# Patient Record
Sex: FEMALE | Race: WHITE | Employment: FULL TIME | URBAN - METROPOLITAN AREA
[De-identification: names, ages, dates, MRNs, and addresses within clinical notes are randomized per-mention and may not be internally consistent; named-entity substitution may affect disease eponyms.]

---

## 2021-05-11 ENCOUNTER — HOSPITAL ENCOUNTER (EMERGENCY)
Facility: CLINIC | Age: 36
Discharge: HOME | End: 2021-05-12
Attending: EMERGENCY MEDICINE | Admitting: EMERGENCY MEDICINE
Payer: COMMERCIAL

## 2021-05-11 VITALS
OXYGEN SATURATION: 98 % | SYSTOLIC BLOOD PRESSURE: 157 MMHG | RESPIRATION RATE: 16 BRPM | DIASTOLIC BLOOD PRESSURE: 107 MMHG | HEART RATE: 68 BPM

## 2021-05-11 DIAGNOSIS — T50.905A MEDICATION REACTION, INITIAL ENCOUNTER: ICD-10-CM

## 2021-05-11 DIAGNOSIS — R55 NEAR SYNCOPE: ICD-10-CM

## 2021-05-11 PROCEDURE — 99284 EMERGENCY DEPT VISIT MOD MDM: CPT

## 2021-05-11 PROCEDURE — C9803 HOPD COVID-19 SPEC COLLECT: HCPCS

## 2021-05-11 PROCEDURE — 81025 URINE PREGNANCY TEST: CPT | Performed by: EMERGENCY MEDICINE

## 2021-05-11 PROCEDURE — 85025 COMPLETE CBC W/AUTO DIFF WBC: CPT | Performed by: EMERGENCY MEDICINE

## 2021-05-11 PROCEDURE — 93005 ELECTROCARDIOGRAM TRACING: CPT

## 2021-05-11 PROCEDURE — 87635 SARS-COV-2 COVID-19 AMP PRB: CPT | Performed by: EMERGENCY MEDICINE

## 2021-05-11 PROCEDURE — 80048 BASIC METABOLIC PNL TOTAL CA: CPT | Performed by: EMERGENCY MEDICINE

## 2021-05-11 PROCEDURE — 81001 URINALYSIS AUTO W/SCOPE: CPT | Performed by: EMERGENCY MEDICINE

## 2021-05-11 ASSESSMENT — ENCOUNTER SYMPTOMS
DIARRHEA: 0
NAUSEA: 0
CONSTIPATION: 0
SHORTNESS OF BREATH: 0
VOMITING: 0
DIZZINESS: 1
HEADACHES: 0
COUGH: 0
NECK PAIN: 0
MYALGIAS: 1
CHILLS: 1
WEAKNESS: 1
DIFFICULTY URINATING: 0
DYSURIA: 0
FATIGUE: 1
FEVER: 0

## 2021-05-12 LAB
ALBUMIN UR-MCNC: NEGATIVE MG/DL
ANION GAP SERPL CALCULATED.3IONS-SCNC: 3 MMOL/L (ref 3–14)
APPEARANCE UR: CLEAR
BASOPHILS # BLD AUTO: 0 10E9/L (ref 0–0.2)
BASOPHILS NFR BLD AUTO: 0.7 %
BILIRUB UR QL STRIP: NEGATIVE
BUN SERPL-MCNC: 14 MG/DL (ref 7–30)
CALCIUM SERPL-MCNC: 8.9 MG/DL (ref 8.5–10.1)
CHLORIDE SERPL-SCNC: 108 MMOL/L (ref 94–109)
CO2 SERPL-SCNC: 28 MMOL/L (ref 20–32)
COLOR UR AUTO: ABNORMAL
CREAT SERPL-MCNC: 0.77 MG/DL (ref 0.52–1.04)
DIFFERENTIAL METHOD BLD: NORMAL
EOSINOPHIL # BLD AUTO: 0.1 10E9/L (ref 0–0.7)
EOSINOPHIL NFR BLD AUTO: 2 %
ERYTHROCYTE [DISTWIDTH] IN BLOOD BY AUTOMATED COUNT: 11.2 % (ref 10–15)
GFR SERPL CREATININE-BSD FRML MDRD: >90 ML/MIN/{1.73_M2}
GLUCOSE SERPL-MCNC: 109 MG/DL (ref 70–99)
GLUCOSE UR STRIP-MCNC: NEGATIVE MG/DL
HCG UR QL: NEGATIVE
HCT VFR BLD AUTO: 36.6 % (ref 35–47)
HGB BLD-MCNC: 12.9 G/DL (ref 11.7–15.7)
HGB UR QL STRIP: ABNORMAL
IMM GRANULOCYTES # BLD: 0 10E9/L (ref 0–0.4)
IMM GRANULOCYTES NFR BLD: 0.3 %
INTERPRETATION ECG - MUSE: NORMAL
KETONES UR STRIP-MCNC: NEGATIVE MG/DL
LABORATORY COMMENT REPORT: NORMAL
LEUKOCYTE ESTERASE UR QL STRIP: NEGATIVE
LYMPHOCYTES # BLD AUTO: 1.8 10E9/L (ref 0.8–5.3)
LYMPHOCYTES NFR BLD AUTO: 29.5 %
MCH RBC QN AUTO: 32.3 PG (ref 26.5–33)
MCHC RBC AUTO-ENTMCNC: 35.2 G/DL (ref 31.5–36.5)
MCV RBC AUTO: 92 FL (ref 78–100)
MONOCYTES # BLD AUTO: 0.5 10E9/L (ref 0–1.3)
MONOCYTES NFR BLD AUTO: 8.1 %
MUCOUS THREADS #/AREA URNS LPF: PRESENT /LPF
NEUTROPHILS # BLD AUTO: 3.7 10E9/L (ref 1.6–8.3)
NEUTROPHILS NFR BLD AUTO: 59.4 %
NITRATE UR QL: NEGATIVE
NRBC # BLD AUTO: 0 10*3/UL
NRBC BLD AUTO-RTO: 0 /100
PH UR STRIP: 5 PH (ref 5–7)
PLATELET # BLD AUTO: 317 10E9/L (ref 150–450)
POTASSIUM SERPL-SCNC: 3.9 MMOL/L (ref 3.4–5.3)
RBC # BLD AUTO: 4 10E12/L (ref 3.8–5.2)
RBC #/AREA URNS AUTO: <1 /HPF (ref 0–2)
SARS-COV-2 RNA RESP QL NAA+PROBE: NEGATIVE
SODIUM SERPL-SCNC: 139 MMOL/L (ref 133–144)
SOURCE: ABNORMAL
SP GR UR STRIP: 1.01 (ref 1–1.03)
SPECIMEN SOURCE: NORMAL
SQUAMOUS #/AREA URNS AUTO: <1 /HPF (ref 0–1)
UROBILINOGEN UR STRIP-MCNC: 0 MG/DL (ref 0–2)
WBC # BLD AUTO: 6.1 10E9/L (ref 4–11)
WBC #/AREA URNS AUTO: <1 /HPF (ref 0–5)

## 2021-05-12 NOTE — ED TRIAGE NOTES
Pt states that she took 2 new medications at 2130 progesterone and premarin and shortly after felt dizzy and that her arms were heavy.

## 2021-05-12 NOTE — ED PROVIDER NOTES
History   Chief Complaint:  Generalized Weakness     The history is provided by the patient.      Ellie Rivera is a 35 year old female with history of depression, asthma, IBS, asthma who presents with generalized weakness. The patient notes that she started new medications, progesterone and premarin, and took them for the first time tonight around 2130.  then about 30 minutes later she had felt very fatigued and weak all over and felt that her arms were very heavy and had troubles lifting them. She states that she also felt very fatigued as well. She states that she felt very weak as well. She also notes that she had chills and could not stop shivering. Her teeth were chattering.  She denies numbness, fever, chest pain, troubles breathing, recent illness or fevers. Denies exposures to COVID or recent cough, emesis, diarrhea, nausea, neck pain, or headache, dysuria, or urinary issues. She denies syncope.       Review of Systems   Constitutional: Positive for chills and fatigue. Negative for fever.   Respiratory: Negative for cough and shortness of breath.    Cardiovascular: Negative for chest pain.   Gastrointestinal: Negative for constipation, diarrhea, nausea and vomiting.   Genitourinary: Negative for difficulty urinating and dysuria.   Musculoskeletal: Positive for myalgias (bilateral arms). Negative for neck pain.   Neurological: Positive for dizziness and weakness. Negative for syncope and headaches.   All other systems reviewed and are negative.    Allergies:  No known drug allergies     Medications:  Progesteron  Premarin   Ativan   Sertraline     Past Medical History:    Depression   IBS  ADHD  Asthma      Past Surgical History:     section     Family History:    Alcoholism  Diabetes  Obesity  Bipolar disorder    Social History:  Smoking status: former smoker  Alcohol use: no  Drug use: no  PCP: Marco Estrada  Presents to the ED alone    Physical Exam     Patient Vitals for the past 24 hrs:   BP  Pulse Resp SpO2   05/11/21 2308 (!) 157/107 68 16 98 %       Physical Exam  Nursing note and vitals reviewed.  Constitutional:  Appears well-developed and well-nourished.   HENT:   Head:    Atraumatic.   Mouth/Throat:   Oropharynx is clear and moist. No oropharyngeal exudate.   Eyes:    Pupils are equal, round, and reactive to light.   Neck:    Normal range of motion. Neck supple.      No tracheal deviation present. No thyromegaly present.   Cardiovascular:  Normal rate, regular rhythm, no murmur   Pulmonary/Chest: Breath sounds are clear and equal without wheezes or crackles.  Abdominal:   Soft. Bowel sounds are normal. Exhibits no distension and      no mass. There is no tenderness.      There is no rebound and no guarding.   Musculoskeletal:  Exhibits no edema.   Lymphadenopathy:  No cervical adenopathy.   Neurological:   Alert and oriented to person, place, and time.   Skin:    Skin is warm and dry. No rash noted. No pallor.     Emergency Department Course     ECG:  ECG taken at 2353, ECG read at 0008  Normal sinus rhythm with short KS  Otherwise  ECG  Rate 67 bpm. KS interval 104 ms. QRS duration 96 ms. QT/QTc 418/441 ms. P-R-T axes 29 70 52.    Laboratory:  CBC: WBC 6.1, HGB 12.9,    BMP: Glucose 109(H), o/w WNL (Creatinine: 0.77)    UA: Blood: trace (A), Mucous: Present  HCG Qualitative: negative   Symptomatic Influenza A/B antigen & COVID-19 PCR: pending    Emergency Department Course:  Reviewed:  I reviewed the patient's nursing notes, vitals, past medical records, Care Everywhere.     Assessments:  2340 I performed an assessment and examination of the patient as noted above.      0038 The patient reports that she is feeling better and that she feels back to normal. Findings and plan explained to the Patient. Patient discharged home with instructions regarding supportive care, medications, and reasons to return. The importance of close follow-up was reviewed.     Disposition:  The patient was  discharged to home.       Impression & Plan   Medical Decision Making:  Ellie Rivera is a 35 year old female who presents for evaluation of adverse reaction to medication.  I feel her symptoms are likely either due to medications reaction or vasovagal near syncopal episode. Her symptom are completely resolved now and I did not feel that there was any cardiac or neurologic problem as a cause for her symptoms. She does note have any symptoms concerning for stroke. I considered also the possibility of early infection however, COVID 19 test returned negative, WBC was normal and her urinalysis was negative. She is not having any cough or respiratory symptoms, and does not have any abdominal pain or tenderness. I feel she was safe for discharge home since her symptoms have completely resolved. She was instructed to return if her symptoms returned otherwise to follow up with her OB to decide if she should stay on her medications.       Covid-19  Ellie Rivera was evaluated during a global COVID-19 pandemic, which necessitated consideration that the patient might be at risk for infection with the SARS-CoV-2 virus that causes COVID-19.   Applicable protocols for evaluation were followed during the patient's care.   COVID-19 was considered as part of the patient's evaluation. The plan for testing is:  a test was obtained during this visit.    Diagnosis:    ICD-10-CM    1. Near syncope  R55 Symptomatic SARS-CoV-2 COVID-19 Virus (Coronavirus) by PCR   2. Medication reaction, initial encounter  T50.905A        Scribe Disclosure:  Silvana FRANCOIS, am serving as a scribe at 11:39 PM on 5/11/2021 to document services personally performed by Shirley Watt MD based on my observations and the provider's statements to me.           Shirley Watt MD  05/12/21 0689

## 2021-06-09 ENCOUNTER — APPOINTMENT (OUTPATIENT)
Dept: MRI IMAGING | Facility: CLINIC | Age: 36
End: 2021-06-09
Attending: EMERGENCY MEDICINE
Payer: COMMERCIAL

## 2021-06-09 ENCOUNTER — HOSPITAL ENCOUNTER (EMERGENCY)
Facility: CLINIC | Age: 36
Discharge: HOME | End: 2021-06-09
Attending: EMERGENCY MEDICINE | Admitting: EMERGENCY MEDICINE
Payer: COMMERCIAL

## 2021-06-09 VITALS
OXYGEN SATURATION: 96 % | HEIGHT: 62 IN | TEMPERATURE: 99.1 F | HEART RATE: 89 BPM | DIASTOLIC BLOOD PRESSURE: 81 MMHG | BODY MASS INDEX: 25.76 KG/M2 | RESPIRATION RATE: 18 BRPM | WEIGHT: 140 LBS | SYSTOLIC BLOOD PRESSURE: 109 MMHG

## 2021-06-09 DIAGNOSIS — R42 DIZZINESS: ICD-10-CM

## 2021-06-09 DIAGNOSIS — R42 LIGHTHEADEDNESS: ICD-10-CM

## 2021-06-09 DIAGNOSIS — R26.9 ABNORMAL GAIT: ICD-10-CM

## 2021-06-09 LAB
ANION GAP SERPL CALCULATED.3IONS-SCNC: 6 MMOL/L (ref 3–14)
BASOPHILS # BLD AUTO: 0 10E9/L (ref 0–0.2)
BASOPHILS NFR BLD AUTO: 0.2 %
BUN SERPL-MCNC: 13 MG/DL (ref 7–30)
CALCIUM SERPL-MCNC: 9.5 MG/DL (ref 8.5–10.1)
CHLORIDE SERPL-SCNC: 105 MMOL/L (ref 94–109)
CO2 SERPL-SCNC: 23 MMOL/L (ref 20–32)
CREAT SERPL-MCNC: 0.77 MG/DL (ref 0.52–1.04)
D DIMER PPP FEU-MCNC: 0.4 UG/ML FEU (ref 0–0.5)
DIFFERENTIAL METHOD BLD: NORMAL
EOSINOPHIL # BLD AUTO: 0 10E9/L (ref 0–0.7)
EOSINOPHIL NFR BLD AUTO: 0.2 %
ERYTHROCYTE [DISTWIDTH] IN BLOOD BY AUTOMATED COUNT: 11.1 % (ref 10–15)
GFR SERPL CREATININE-BSD FRML MDRD: >90 ML/MIN/{1.73_M2}
GLUCOSE SERPL-MCNC: 96 MG/DL (ref 70–99)
HCG SERPL QL: NEGATIVE
HCT VFR BLD AUTO: 37 % (ref 35–47)
HGB BLD-MCNC: 13.1 G/DL (ref 11.7–15.7)
IMM GRANULOCYTES # BLD: 0 10E9/L (ref 0–0.4)
IMM GRANULOCYTES NFR BLD: 0.4 %
INTERPRETATION ECG - MUSE: NORMAL
LYMPHOCYTES # BLD AUTO: 0.8 10E9/L (ref 0.8–5.3)
LYMPHOCYTES NFR BLD AUTO: 9.8 %
MCH RBC QN AUTO: 32.4 PG (ref 26.5–33)
MCHC RBC AUTO-ENTMCNC: 35.4 G/DL (ref 31.5–36.5)
MCV RBC AUTO: 92 FL (ref 78–100)
MONOCYTES # BLD AUTO: 0.4 10E9/L (ref 0–1.3)
MONOCYTES NFR BLD AUTO: 4.9 %
NEUTROPHILS # BLD AUTO: 6.8 10E9/L (ref 1.6–8.3)
NEUTROPHILS NFR BLD AUTO: 84.5 %
NRBC # BLD AUTO: 0 10*3/UL
NRBC BLD AUTO-RTO: 0 /100
PLATELET # BLD AUTO: 314 10E9/L (ref 150–450)
POTASSIUM SERPL-SCNC: 4.3 MMOL/L (ref 3.4–5.3)
RBC # BLD AUTO: 4.04 10E12/L (ref 3.8–5.2)
SODIUM SERPL-SCNC: 134 MMOL/L (ref 133–144)
TROPONIN I SERPL-MCNC: <0.015 UG/L (ref 0–0.04)
TSH SERPL DL<=0.005 MIU/L-ACNC: 0.85 MU/L (ref 0.4–4)
WBC # BLD AUTO: 8.1 10E9/L (ref 4–11)

## 2021-06-09 PROCEDURE — 84703 CHORIONIC GONADOTROPIN ASSAY: CPT | Performed by: EMERGENCY MEDICINE

## 2021-06-09 PROCEDURE — 84484 ASSAY OF TROPONIN QUANT: CPT | Performed by: EMERGENCY MEDICINE

## 2021-06-09 PROCEDURE — 255N000002 HC RX 255 OP 636: Performed by: EMERGENCY MEDICINE

## 2021-06-09 PROCEDURE — 85379 FIBRIN DEGRADATION QUANT: CPT | Performed by: EMERGENCY MEDICINE

## 2021-06-09 PROCEDURE — 84443 ASSAY THYROID STIM HORMONE: CPT | Performed by: EMERGENCY MEDICINE

## 2021-06-09 PROCEDURE — 80048 BASIC METABOLIC PNL TOTAL CA: CPT | Performed by: EMERGENCY MEDICINE

## 2021-06-09 PROCEDURE — 70544 MR ANGIOGRAPHY HEAD W/O DYE: CPT

## 2021-06-09 PROCEDURE — A9585 GADOBUTROL INJECTION: HCPCS | Performed by: EMERGENCY MEDICINE

## 2021-06-09 PROCEDURE — 70553 MRI BRAIN STEM W/O & W/DYE: CPT

## 2021-06-09 PROCEDURE — 99285 EMERGENCY DEPT VISIT HI MDM: CPT | Mod: 25

## 2021-06-09 PROCEDURE — 85025 COMPLETE CBC W/AUTO DIFF WBC: CPT | Performed by: EMERGENCY MEDICINE

## 2021-06-09 PROCEDURE — 70549 MR ANGIOGRAPH NECK W/O&W/DYE: CPT

## 2021-06-09 PROCEDURE — 93005 ELECTROCARDIOGRAM TRACING: CPT

## 2021-06-09 RX ORDER — GADOBUTROL 604.72 MG/ML
10 INJECTION INTRAVENOUS ONCE
Status: COMPLETED | OUTPATIENT
Start: 2021-06-09 | End: 2021-06-09

## 2021-06-09 RX ADMIN — GADOBUTROL 10 ML: 604.72 INJECTION INTRAVENOUS at 13:19

## 2021-06-09 ASSESSMENT — ENCOUNTER SYMPTOMS
WEAKNESS: 1
NUMBNESS: 1
NAUSEA: 1
DIZZINESS: 0
LIGHT-HEADEDNESS: 0
DYSURIA: 0
FEVER: 0
HEMATURIA: 0
VOMITING: 0

## 2021-06-09 ASSESSMENT — MIFFLIN-ST. JEOR: SCORE: 1283.29

## 2021-06-09 NOTE — ED PROVIDER NOTES
History   Chief Complaint:  Dizziness     HPI   Ellie Rivera is a 35 year old female with history of ovarian failure on progesterone and estrogen who presents to the ED for an evaluation of dizziness, lightheadedness, and drifting off to the left with gait instability. The patient states her symptoms began progressively after she woke up this morning around 1030 to look after her daughter. She went to the bathroom and felt like she was going to pass out after but did not. She has not had lightheadedness and dizziness since. She also has developed some chest pain and nausea without emesis since the onset. She additionally endorses some subjective numbness/weakness. She otherwise denies any falls, fever, double vision, vision loss, dysuria, hematuria, vaginal cramping, or vaginal bleeding. She did have some bleeding a couple of days ago however but this was due to her period.       Review of Systems   Constitutional: Negative for fever.   Eyes: Negative for visual disturbance.   Cardiovascular: Positive for chest pain.   Gastrointestinal: Positive for nausea. Negative for vomiting.   Genitourinary: Negative for dysuria, hematuria, vaginal bleeding and vaginal pain.   Musculoskeletal: Positive for gait problem (leaning to the left).   Neurological: Positive for weakness (subjective) and numbness (subjective). Negative for dizziness (resolved) and light-headedness (resolved).   All other systems reviewed and are negative.    Allergies:  Methylphenidate analogues  Fluoxetine  Nitrofurantoin  Dextroamphetamine sulfate  SSRI's  Nitrofuran derivatives    Medications:  Senokot S  Percocet  Ativan  Procardia  Zoloft  Ativan  Prometrium  Premarin    Past Medical History:    Ovarian failure  Asthma  Depression  IBS  ADHD  Anxiety  Gonococcus infection  Anemia  GC cervicitis    Past Surgical History:     section    Family History:    Bipolar disorder  Type II diabetes mellitus  Anxiety    Social History:  The patient  "presented with /significant other and daughter.    Physical Exam     Patient Vitals for the past 24 hrs:   BP Temp Temp src Pulse Resp SpO2 Height Weight   06/09/21 1350 109/81 -- -- 89 -- -- -- --   06/09/21 1345 111/71 -- -- -- -- -- -- --   06/09/21 1330 (!) 135/97 -- -- 81 -- 96 % -- --   06/09/21 1200 (!) 177/136 -- -- 87 -- 100 % -- --   06/09/21 1137 (!) 162/107 99.1  F (37.3  C) Oral 90 18 100 % 1.575 m (5' 2\") 63.5 kg (140 lb)       Physical Exam  General: Alert, appears well-developed and well-nourished. Cooperative.     In mild distress  HEENT:  Head:  Atraumatic  Ears:  External ears are normal  Mouth/Throat:  Oropharynx is without erythema or exudate and mucous membranes are moist.   Eyes:   Conjunctivae normal and EOM are normal. No scleral icterus.    Pupils are equal, round, and reactive to light.   CV:  Normal rate, regular rhythm, normal heart sounds and radial pulses are 2+ and symmetric.  No murmur.  Resp:  Breath sounds are clear bilaterally    Non-labored, no retractions or accessory muscle use  GI:  Abdomen is soft, no distension, no tenderness. No rebound or guarding.  No CVA tenderness bilaterally  MS:  Normal range of motion. No edema.    Normal strength in all 4 extremities.     Back atraumatic.    No midline cervical, thoracic, or lumbar tenderness  Skin:  Warm and dry.  No rash or lesions noted.  Neuro: Alert. Normal strength.  Subjective numbness to left face and left upper extremity in comparison with right side. GCS: 15.  Cranial nerves 2-12 intact otherwise intact except for subjective numbness.  Finger-to-nose intact bilaterally.  Rapid alternating movements intact.  Gait not assessed due to lightheadedness.  Psych:  Very anxious. Normal mood and affect.    Emergency Department Course   ECG  ECG taken at 1153, ECG read at 1209  Normal sinus rhythm. Normal ECG.   No significant change as compared to prior, dated 5/11/21.  Rate 79 bpm. AK interval 138 ms. QRS duration 76 ms. " QT/QTc 386/442 ms. P-R-T axes 59 70 56.     Imaging:  MR Brain without & with contrast:  Normal brain MRI.     Imaging independently reviewed and agree with radiologist interpretation.     MR Head without contrast angiogram:  Normal MR angiogram of the head.       Imaging independently reviewed and agree with radiologist interpretation.     MRA Angiogram Neck without & with contrast:  Normal MR angiogram of the neck.     Imaging independently reviewed and agree with radiologist interpretation.     Laboratory:  CBC: WBC 8.1, HGB 13.1,      BMP: AWNL (Creatinine 0.77)     Ddimer: 0.4    TSH: 0.85    Troponin (Collected 1150): <0.015    HCG Qualitative Pregnancy Blood: negative    Emergency Department Course:    Reviewed:  1132 I reviewed nursing notes, vitals and past medical history.    Assessments:  1137 I obtained history and examined the patient as noted above.   1334 I rechecked the patient and discussed her work-up here thus far.   1400 I rechecked the patient and discussed all results.    Consults:   1150 I spoke with the stroke neurology team regarding the patient's presentation, findings, and plan of care.    Interventions:  1319: Gadavist 10 mL IV    Disposition:  The patient was discharged to home.       Impression & Plan   Medical Decision Making:  Ellie Rivera is a 35 year old female who presents to the emergency department approximately 1 hour after sudden onset dizziness, lightheadedness, and drifting off towards the left with gait instability. Patient initially was mildly hypertensive on arrival and out of concern for potential sinister intracranial pathology such as stroke or intracranial hemorrhage we did obtain emergent MRI imaging of the brain including MRI angiography of the head and neck. Reassuringly MRI imaging of the brain showed no evidence of sinister acute intracranial process. Angiography of the head/neck was unremarkable with no evidence of stenosis. EKG showed normal sinus  rhythm with no concerning ischemic changes. Her troponins undetectable and lower concern for ACS. Reassuringly no evidence of cardiac arrhythmias  Patient is not currently pregnant, blood counts grossly unremarkable with no signs of anemia. Patient's renal function electrolytes are normal.  No evidence of thyroid dysfunction.  Patient is not orthostatic.  Unclear to the exact etiology of the patient's lightheadedness and dizziness symptoms here today. I also have no clear explanation for her focal neurologic symptoms including subjective numbness to the left face and left arm. These may be paresthesias, but reassuringly no sinister acute intracranial events were discovered today. Close outpatient follow-up is encouraged with her primary care provider. Patient may need referral to general neurology if paresthesias persist or additional lightheadedness continues without clear etiology. After all questions answered and return precautions understood, discharged home.     Critical Care Time: none    Covid-19  Ellie Rivera was evaluated during a global COVID-19 pandemic, which necessitated consideration that the patient might be at risk for infection with the SARS-CoV-2 virus that causes COVID-19.   Applicable protocols for evaluation were followed during the patient's care.   COVID-19 was considered as part of the patient's evaluation. The plan for testing is:  a test was obtained during this visit.    Diagnosis:    ICD-10-CM    1. Dizziness  R42    2. Lightheadedness  R42    3. Abnormal gait  R26.9        Discharge Medications:  New Prescriptions    No medications on file       Scribe Disclosure:  Shavonne FRANCOIS, am serving as a scribe at 11:38 AM on 6/9/2021 to document services personally performed by Ajith Borrego MD based on my observations and the provider's statements to me.              Ajith Borrego MD  06/09/21 1561

## 2022-02-01 ENCOUNTER — APPOINTMENT (OUTPATIENT)
Dept: CT IMAGING | Facility: CLINIC | Age: 37
End: 2022-02-01
Attending: PHYSICIAN ASSISTANT
Payer: COMMERCIAL

## 2022-02-01 ENCOUNTER — HOSPITAL ENCOUNTER (EMERGENCY)
Facility: CLINIC | Age: 37
Discharge: HOME | End: 2022-02-01
Attending: PHYSICIAN ASSISTANT | Admitting: PHYSICIAN ASSISTANT
Payer: COMMERCIAL

## 2022-02-01 VITALS
OXYGEN SATURATION: 100 % | HEIGHT: 62 IN | DIASTOLIC BLOOD PRESSURE: 93 MMHG | WEIGHT: 123 LBS | RESPIRATION RATE: 18 BRPM | HEART RATE: 83 BPM | BODY MASS INDEX: 22.63 KG/M2 | SYSTOLIC BLOOD PRESSURE: 155 MMHG | TEMPERATURE: 98.5 F

## 2022-02-01 DIAGNOSIS — R51.9 HEADACHE: ICD-10-CM

## 2022-02-01 DIAGNOSIS — R19.7 DIARRHEA: ICD-10-CM

## 2022-02-01 DIAGNOSIS — R53.1 GENERALIZED WEAKNESS: ICD-10-CM

## 2022-02-01 DIAGNOSIS — M54.2 NECK PAIN: ICD-10-CM

## 2022-02-01 LAB
ANION GAP SERPL CALCULATED.3IONS-SCNC: 6 MMOL/L (ref 3–14)
BASOPHILS # BLD AUTO: 0 10E3/UL (ref 0–0.2)
BASOPHILS NFR BLD AUTO: 1 %
BUN SERPL-MCNC: 9 MG/DL (ref 7–30)
CALCIUM SERPL-MCNC: 9 MG/DL (ref 8.5–10.1)
CHLORIDE BLD-SCNC: 107 MMOL/L (ref 94–109)
CO2 SERPL-SCNC: 26 MMOL/L (ref 20–32)
CREAT SERPL-MCNC: 0.7 MG/DL (ref 0.52–1.04)
EOSINOPHIL # BLD AUTO: 0 10E3/UL (ref 0–0.7)
EOSINOPHIL NFR BLD AUTO: 0 %
ERYTHROCYTE [DISTWIDTH] IN BLOOD BY AUTOMATED COUNT: 10.7 % (ref 10–15)
GFR SERPL CREATININE-BSD FRML MDRD: >90 ML/MIN/1.73M2
GLUCOSE BLD-MCNC: 100 MG/DL (ref 70–99)
HCG SERPL QL: NEGATIVE
HCT VFR BLD AUTO: 36.7 % (ref 35–47)
HGB BLD-MCNC: 12.9 G/DL (ref 11.7–15.7)
IMM GRANULOCYTES # BLD: 0 10E3/UL
IMM GRANULOCYTES NFR BLD: 0 %
LYMPHOCYTES # BLD AUTO: 1.3 10E3/UL (ref 0.8–5.3)
LYMPHOCYTES NFR BLD AUTO: 28 %
MAGNESIUM SERPL-MCNC: 2.3 MG/DL (ref 1.6–2.3)
MCH RBC QN AUTO: 32.7 PG (ref 26.5–33)
MCHC RBC AUTO-ENTMCNC: 35.1 G/DL (ref 31.5–36.5)
MCV RBC AUTO: 93 FL (ref 78–100)
MONOCYTES # BLD AUTO: 0.3 10E3/UL (ref 0–1.3)
MONOCYTES NFR BLD AUTO: 7 %
NEUTROPHILS # BLD AUTO: 3 10E3/UL (ref 1.6–8.3)
NEUTROPHILS NFR BLD AUTO: 64 %
NRBC # BLD AUTO: 0 10E3/UL
NRBC BLD AUTO-RTO: 0 /100
PLATELET # BLD AUTO: 282 10E3/UL (ref 150–450)
POTASSIUM BLD-SCNC: 3.7 MMOL/L (ref 3.4–5.3)
RBC # BLD AUTO: 3.95 10E6/UL (ref 3.8–5.2)
SODIUM SERPL-SCNC: 139 MMOL/L (ref 133–144)
WBC # BLD AUTO: 4.7 10E3/UL (ref 4–11)

## 2022-02-01 PROCEDURE — 258N000003 HC RX IP 258 OP 636: Performed by: PHYSICIAN ASSISTANT

## 2022-02-01 PROCEDURE — 83735 ASSAY OF MAGNESIUM: CPT | Performed by: PHYSICIAN ASSISTANT

## 2022-02-01 PROCEDURE — 70450 CT HEAD/BRAIN W/O DYE: CPT | Mod: XS

## 2022-02-01 PROCEDURE — 70498 CT ANGIOGRAPHY NECK: CPT

## 2022-02-01 PROCEDURE — 36415 COLL VENOUS BLD VENIPUNCTURE: CPT | Performed by: PHYSICIAN ASSISTANT

## 2022-02-01 PROCEDURE — 96360 HYDRATION IV INFUSION INIT: CPT | Mod: 59

## 2022-02-01 PROCEDURE — 99285 EMERGENCY DEPT VISIT HI MDM: CPT | Mod: 25

## 2022-02-01 PROCEDURE — 250N000013 HC RX MED GY IP 250 OP 250 PS 637: Performed by: PHYSICIAN ASSISTANT

## 2022-02-01 PROCEDURE — 84703 CHORIONIC GONADOTROPIN ASSAY: CPT | Performed by: PHYSICIAN ASSISTANT

## 2022-02-01 PROCEDURE — 80048 BASIC METABOLIC PNL TOTAL CA: CPT | Performed by: PHYSICIAN ASSISTANT

## 2022-02-01 PROCEDURE — 250N000011 HC RX IP 250 OP 636: Performed by: PHYSICIAN ASSISTANT

## 2022-02-01 PROCEDURE — 250N000009 HC RX 250: Performed by: PHYSICIAN ASSISTANT

## 2022-02-01 PROCEDURE — 70496 CT ANGIOGRAPHY HEAD: CPT

## 2022-02-01 PROCEDURE — 85025 COMPLETE CBC W/AUTO DIFF WBC: CPT | Performed by: PHYSICIAN ASSISTANT

## 2022-02-01 RX ORDER — IOPAMIDOL 755 MG/ML
70 INJECTION, SOLUTION INTRAVASCULAR ONCE
Status: COMPLETED | OUTPATIENT
Start: 2022-02-01 | End: 2022-02-01

## 2022-02-01 RX ORDER — ACETAMINOPHEN 325 MG/1
975 TABLET ORAL ONCE
Status: COMPLETED | OUTPATIENT
Start: 2022-02-01 | End: 2022-02-01

## 2022-02-01 RX ADMIN — SODIUM CHLORIDE 90 ML: 900 INJECTION INTRAVENOUS at 12:26

## 2022-02-01 RX ADMIN — SODIUM CHLORIDE 1000 ML: 9 INJECTION, SOLUTION INTRAVENOUS at 12:04

## 2022-02-01 RX ADMIN — ACETAMINOPHEN 975 MG: 325 TABLET, FILM COATED ORAL at 12:46

## 2022-02-01 RX ADMIN — IOPAMIDOL 70 ML: 755 INJECTION, SOLUTION INTRAVENOUS at 12:26

## 2022-02-01 ASSESSMENT — ENCOUNTER SYMPTOMS
NUMBNESS: 0
SPEECH DIFFICULTY: 0
NECK PAIN: 1
WEAKNESS: 1
ABDOMINAL PAIN: 0
HEADACHES: 1
VOMITING: 0
NERVOUS/ANXIOUS: 1
RHINORRHEA: 0
SORE THROAT: 0
EYE PAIN: 1
CHILLS: 1
NAUSEA: 0
DIZZINESS: 0
FEVER: 0
DIARRHEA: 1
SHORTNESS OF BREATH: 0

## 2022-02-01 ASSESSMENT — MIFFLIN-ST. JEOR: SCORE: 1201.17

## 2022-02-01 NOTE — ED PROVIDER NOTES
"  History     Chief Complaint:  Headache    The history is provided by the patient.      Ellie Rivera is a 36 year old female who presents with a headache. The patient complains of a couple days of headache which she believed to feel like a migraine, but then woke up this morning and the headache had turned into a \"pressure.\" This pressure pain is primarily located behind her eyes, but spreads throughout her entire head and down the back of her neck, but excluding the jaw. She says her head feels very heavy, and everything feels like it is in slow motion. She also endorses generalized weakness, and feeling cold with chills, but says this is normal when she is anxious. She recounts her daughter getting sick with the stomach flu last week, so Ellie had been generally anxious, and was having diarrhea, which is also apparently normal for her whenever she gets anxious. She is unsure if she has compensated in the time since with regard to fluid intake. The patient denies any dizziness, abdominal pain, nausea, vomiting, chest pain, shortness of breath, sore throat, rhinorrhea, fever, numbness, tingling, speech changes, visual disturbances, recent falls or trauma, or history of headaches/migraines.     Review of Systems   Constitutional: Positive for chills. Negative for fever.   HENT: Negative for rhinorrhea and sore throat.    Eyes: Positive for pain. Negative for visual disturbance.   Respiratory: Negative for shortness of breath.    Cardiovascular: Negative for chest pain.   Gastrointestinal: Positive for diarrhea. Negative for abdominal pain, nausea and vomiting.   Musculoskeletal: Positive for neck pain.   Neurological: Positive for weakness (generalized) and headaches. Negative for dizziness, speech difficulty and numbness.   Psychiatric/Behavioral: The patient is nervous/anxious.    All other systems reviewed and are negative.    Allergies:  Methylphenidate  Dextroamphetamine  Nitrofuran Derivatives  Serotonin " "Reuptake Inhibitors    Medications:    Premarin  Prometrium     Past Medical History:    Ovarian failure  Pre-eclampsia, postpartum  Vitamin D deficiency  Mild intermittent asthma  Anxiety  Depression   IBS  ADHD   Anemia during pregnancy     Past Surgical History:     section     Family History:    Mother: type II diabetes, obesity     Social History:  The patient presents to the ED alone.     Physical Exam     Patient Vitals for the past 24 hrs:   BP Temp Temp src Pulse Resp SpO2 Height Weight   22 1128 (!) 155/93 98.5  F (36.9  C) Temporal 83 18 100 % 1.575 m (5' 2\") 55.8 kg (123 lb)       Physical Exam  Constitutional: Pleasant. Cooperative.  Eyes: Pupils equally round and reactive  HENT: Head is normal in appearance. Oropharynx is normal with moist mucus membranes.  Cardiovascular: Regular rate and rhythm and without murmurs.  Respiratory: Normal respiratory effort, lungs are clear bilaterally.  GI: Abdomen is soft, non-tender, non-distended. No guarding, rebound, or rigidity.  Musculoskeletal: No C spine TTP. Full ROM of C spine.  Skin: Normal, without rash.  Neurologic: Cranial nerves II-XII intact, nl cognition, no focal deficits. Alert and oriented x 3. Normal  strength. Normal leg raise. Sensation to light touch intact throughout all 4 extremities. 5/5 strength with dorsiflexion and plantarflexion bilaterally. No pronator drift. Normal finger nose finger. No meningismus.  Psychiatric: Normal affect.  Nursing notes and vital signs reviewed.      Emergency Department Course     Imaging:  CT Head Neck Angio w/o & w Contrast   Preliminary Result   IMPRESSION:    CTA Head: No evidence of large vessel occlusion or high-grade   stenosis.    CTA Neck: No evidence of large vessel occlusion or high-grade   stenosis.       Head CT and CTA findings were discussed by phone between Dr. Hassan   and Reagan Dickson at 12:57 PM on 2022.      CT Head w/o Contrast   Final Result   IMPRESSION:    No acute " intracranial abnormality.      KENYON VARGAS MD            SYSTEM ID:  [id number]          Laboratory:  Labs Ordered and Resulted from Time of ED Arrival to Time of ED Departure   BASIC METABOLIC PANEL - Abnormal       Result Value    Sodium 139      Potassium 3.7      Chloride 107      Carbon Dioxide (CO2) 26      Anion Gap 6      Urea Nitrogen 9      Creatinine 0.70      Calcium 9.0      Glucose 100 (*)     GFR Estimate >90     MAGNESIUM - Normal    Magnesium 2.3     HCG QUALITATIVE PREGNANCY - Normal    hCG Serum Qualitative Negative     CBC WITH PLATELETS AND DIFFERENTIAL    WBC Count 4.7      RBC Count 3.95      Hemoglobin 12.9      Hematocrit 36.7      MCV 93      MCH 32.7      MCHC 35.1      RDW 10.7      Platelet Count 282      % Neutrophils 64      % Lymphocytes 28      % Monocytes 7      % Eosinophils 0      % Basophils 1      % Immature Granulocytes 0      NRBCs per 100 WBC 0      Absolute Neutrophils 3.0      Absolute Lymphocytes 1.3      Absolute Monocytes 0.3      Absolute Eosinophils 0.0      Absolute Basophils 0.0      Absolute Immature Granulocytes 0.0      Absolute NRBCs 0.0       Emergency Department Course:    Reviewed:  I reviewed nursing notes, vitals, past history and care everywhere    Assessments:  1138 I obtained history and examined the patient as noted above.   1309 I rechecked the patient and explained findings. Prepared for discharge.     Interventions:  1204 NS 1 L IV   1246 Tylenol 975 mg PO    Disposition:  The patient was discharged to home.    Impression & Plan      Medical Decision Making:  Ellie Rivera is a 36 year old female who presents to the ED for evaluation of a headache.  Headache is been ongoing for the past few days, however she feels like it has progressed somewhat, prompting presentation to the ED.  Patient is primarily concerned for CVA.  See HPI as above for additional details.  Vitals and physical exam as above.  Differential was broad including meningitis, mass,  cervical artery dissection, SAH, ICH, cluster headache, migraine headache, CVA, tension headache, among others.  Patient's NIHSS is 0. BEFAST is negative. Her neurologic exam is normal. Have lower suspicion at this time for CVA. After discussion with patient about work up, with shared decision making elected to pursue imaging as above. Fortunately, this returned negative for acute abnormality. Lab work as above otherwise unremarkable. Patient provided Tylenol as above with improvement of symptoms. Patient declined any other medications, requesting discharge to home in setting of reassuring work up. I have lower suspicion for remainder of differential at this time based upon above history, physical exam, and work up.    Diagnosis:    ICD-10-CM    1. Headache  R51.9    2. Neck pain  M54.2    3. Generalized weakness  R53.1    4. Diarrhea  R19.7      Scribe Disclosure:  I, Samir Abhishek, am serving as a scribe at 11:31 AM on 2/1/2022 to document services personally performed by Reagan Dickson PA-C based on my observations and the provider's statements to me.     This record was created at least in part using electronic voice recognition software, so please excuse any typographical errors.       Reagan Dickson PA-C  02/01/22 1035